# Patient Record
Sex: MALE | Race: WHITE | NOT HISPANIC OR LATINO | Employment: OTHER | URBAN - METROPOLITAN AREA
[De-identification: names, ages, dates, MRNs, and addresses within clinical notes are randomized per-mention and may not be internally consistent; named-entity substitution may affect disease eponyms.]

---

## 2023-05-10 LAB
ALANINE AMINOTRANSFERASE (SGPT) (U/L) IN SER/PLAS: 29 U/L (ref 10–52)
ALBUMIN (G/DL) IN SER/PLAS: 4.5 G/DL (ref 3.4–5)
ALBUMIN (MG/L) IN URINE: <7 MG/L
ALBUMIN/CREATININE (UG/MG) IN URINE: NORMAL UG/MG CRT (ref 0–30)
ALKALINE PHOSPHATASE (U/L) IN SER/PLAS: 107 U/L (ref 33–136)
ANION GAP IN SER/PLAS: 14 MMOL/L (ref 10–20)
ASPARTATE AMINOTRANSFERASE (SGOT) (U/L) IN SER/PLAS: 34 U/L (ref 9–39)
BILIRUBIN TOTAL (MG/DL) IN SER/PLAS: 0.6 MG/DL (ref 0–1.2)
CALCIUM (MG/DL) IN SER/PLAS: 10.1 MG/DL (ref 8.6–10.6)
CARBON DIOXIDE, TOTAL (MMOL/L) IN SER/PLAS: 26 MMOL/L (ref 21–32)
CHLORIDE (MMOL/L) IN SER/PLAS: 106 MMOL/L (ref 98–107)
CREATININE (MG/DL) IN SER/PLAS: 1.54 MG/DL (ref 0.5–1.3)
CREATININE (MG/DL) IN URINE: 45.9 MG/DL (ref 20–370)
ESTIMATED AVERAGE GLUCOSE FOR HBA1C: 177 MG/DL
GFR MALE: 44 ML/MIN/1.73M2
GLUCOSE (MG/DL) IN SER/PLAS: 194 MG/DL (ref 74–99)
HEMOGLOBIN A1C/HEMOGLOBIN TOTAL IN BLOOD: 7.8 %
POTASSIUM (MMOL/L) IN SER/PLAS: 5 MMOL/L (ref 3.5–5.3)
PROTEIN TOTAL: 7.1 G/DL (ref 6.4–8.2)
SODIUM (MMOL/L) IN SER/PLAS: 141 MMOL/L (ref 136–145)
UREA NITROGEN (MG/DL) IN SER/PLAS: 30 MG/DL (ref 6–23)

## 2023-05-12 LAB
ALANINE AMINOTRANSFERASE (SGPT) (U/L) IN SER/PLAS: 24 U/L (ref 10–52)
ALBUMIN (G/DL) IN SER/PLAS: 4.4 G/DL (ref 3.4–5)
ALKALINE PHOSPHATASE (U/L) IN SER/PLAS: 96 U/L (ref 33–136)
ANION GAP IN SER/PLAS: 11 MMOL/L (ref 10–20)
ASPARTATE AMINOTRANSFERASE (SGOT) (U/L) IN SER/PLAS: 28 U/L (ref 9–39)
BILIRUBIN TOTAL (MG/DL) IN SER/PLAS: 0.7 MG/DL (ref 0–1.2)
CALCIUM (MG/DL) IN SER/PLAS: 9.8 MG/DL (ref 8.6–10.6)
CARBON DIOXIDE, TOTAL (MMOL/L) IN SER/PLAS: 28 MMOL/L (ref 21–32)
CHLORIDE (MMOL/L) IN SER/PLAS: 108 MMOL/L (ref 98–107)
CHOLESTEROL (MG/DL) IN SER/PLAS: 135 MG/DL (ref 0–199)
CHOLESTEROL IN HDL (MG/DL) IN SER/PLAS: 62.6 MG/DL
CHOLESTEROL/HDL RATIO: 2.2
CREATININE (MG/DL) IN SER/PLAS: 1.34 MG/DL (ref 0.5–1.3)
ESTIMATED AVERAGE GLUCOSE FOR HBA1C: 177 MG/DL
GFR MALE: 51 ML/MIN/1.73M2
GLUCOSE (MG/DL) IN SER/PLAS: 140 MG/DL (ref 74–99)
HEMOGLOBIN A1C/HEMOGLOBIN TOTAL IN BLOOD: 7.8 %
LDL: 56 MG/DL (ref 0–99)
POTASSIUM (MMOL/L) IN SER/PLAS: 4.4 MMOL/L (ref 3.5–5.3)
PROTEIN TOTAL: 6.8 G/DL (ref 6.4–8.2)
SODIUM (MMOL/L) IN SER/PLAS: 143 MMOL/L (ref 136–145)
TRIGLYCERIDE (MG/DL) IN SER/PLAS: 84 MG/DL (ref 0–149)
UREA NITROGEN (MG/DL) IN SER/PLAS: 27 MG/DL (ref 6–23)
VLDL: 17 MG/DL (ref 0–40)

## 2023-05-13 LAB — LIPOPROTEIN A SER/PLAS: 12 MG/DL

## 2023-06-23 LAB
ALANINE AMINOTRANSFERASE (SGPT) (U/L) IN SER/PLAS: 25 U/L (ref 10–52)
ALBUMIN (G/DL) IN SER/PLAS: 4.2 G/DL (ref 3.4–5)
ALKALINE PHOSPHATASE (U/L) IN SER/PLAS: 102 U/L (ref 33–136)
ANION GAP IN SER/PLAS: 16 MMOL/L (ref 10–20)
ASPARTATE AMINOTRANSFERASE (SGOT) (U/L) IN SER/PLAS: 35 U/L (ref 9–39)
BILIRUBIN TOTAL (MG/DL) IN SER/PLAS: 0.8 MG/DL (ref 0–1.2)
CALCIUM (MG/DL) IN SER/PLAS: 9.5 MG/DL (ref 8.6–10.6)
CARBON DIOXIDE, TOTAL (MMOL/L) IN SER/PLAS: 22 MMOL/L (ref 21–32)
CHLORIDE (MMOL/L) IN SER/PLAS: 108 MMOL/L (ref 98–107)
CHOLESTEROL (MG/DL) IN SER/PLAS: 136 MG/DL (ref 0–199)
CHOLESTEROL IN HDL (MG/DL) IN SER/PLAS: 52.3 MG/DL
CHOLESTEROL/HDL RATIO: 2.6
CREATININE (MG/DL) IN SER/PLAS: 1.48 MG/DL (ref 0.5–1.3)
GFR MALE: 46 ML/MIN/1.73M2
GLUCOSE (MG/DL) IN SER/PLAS: 89 MG/DL (ref 74–99)
LDL: 61 MG/DL (ref 0–99)
MAGNESIUM (MG/DL) IN SER/PLAS: 2.27 MG/DL (ref 1.6–2.4)
POTASSIUM (MMOL/L) IN SER/PLAS: 4.5 MMOL/L (ref 3.5–5.3)
PROTEIN TOTAL: 6.6 G/DL (ref 6.4–8.2)
SODIUM (MMOL/L) IN SER/PLAS: 141 MMOL/L (ref 136–145)
TRIGLYCERIDE (MG/DL) IN SER/PLAS: 114 MG/DL (ref 0–149)
UREA NITROGEN (MG/DL) IN SER/PLAS: 33 MG/DL (ref 6–23)
VLDL: 23 MG/DL (ref 0–40)

## 2023-10-03 DIAGNOSIS — I50.32 CHRONIC DIASTOLIC CONGESTIVE HEART FAILURE (MULTI): Primary | ICD-10-CM

## 2023-10-04 RX ORDER — EMPAGLIFLOZIN 25 MG/1
25 TABLET, FILM COATED ORAL DAILY
COMMUNITY
Start: 2023-06-02 | End: 2023-10-04 | Stop reason: SDUPTHER

## 2023-10-04 RX ORDER — EMPAGLIFLOZIN 25 MG/1
25 TABLET, FILM COATED ORAL DAILY
Qty: 90 TABLET | Refills: 3 | Status: SHIPPED | OUTPATIENT
Start: 2023-10-04

## 2023-10-05 ENCOUNTER — PHARMACY VISIT (OUTPATIENT)
Dept: PHARMACY | Facility: CLINIC | Age: 88
End: 2023-10-05
Payer: COMMERCIAL

## 2023-10-05 PROCEDURE — RXMED WILLOW AMBULATORY MEDICATION CHARGE

## 2023-11-29 PROBLEM — I21.4 NON-STEMI (NON-ST ELEVATED MYOCARDIAL INFARCTION) (MULTI): Status: ACTIVE | Noted: 2023-11-29

## 2023-11-29 PROBLEM — N18.9 CKD (CHRONIC KIDNEY DISEASE): Status: ACTIVE | Noted: 2023-11-29

## 2023-11-29 PROBLEM — I25.10 CORONARY ARTERY DISEASE: Status: ACTIVE | Noted: 2023-11-29

## 2023-11-29 PROBLEM — I25.110 UNSTABLE ANGINA PECTORIS DUE TO CORONARY ARTERIOSCLEROSIS (MULTI): Status: ACTIVE | Noted: 2023-11-29

## 2023-11-29 PROBLEM — R00.1 BRADYCARDIA: Status: ACTIVE | Noted: 2023-11-29

## 2023-11-29 PROBLEM — E78.5 HYPERLIPIDEMIA: Status: ACTIVE | Noted: 2023-11-29

## 2023-11-29 PROBLEM — E11.9 DIABETES MELLITUS (MULTI): Status: ACTIVE | Noted: 2023-11-29

## 2023-11-29 PROBLEM — I10 HYPERTENSION, ESSENTIAL, BENIGN: Status: ACTIVE | Noted: 2023-11-29

## 2023-11-29 RX ORDER — ATORVASTATIN CALCIUM 40 MG/1
40 TABLET, FILM COATED ORAL DAILY
COMMUNITY

## 2023-11-29 RX ORDER — GEMFIBROZIL 600 MG/1
TABLET, FILM COATED ORAL 2 TIMES DAILY
COMMUNITY
End: 2023-12-06 | Stop reason: WASHOUT

## 2023-11-29 RX ORDER — GLIMEPIRIDE 1 MG/1
1 TABLET ORAL DAILY
COMMUNITY
End: 2023-12-06 | Stop reason: WASHOUT

## 2023-11-29 RX ORDER — CLOPIDOGREL BISULFATE 75 MG/1
75 TABLET ORAL DAILY
COMMUNITY
End: 2023-12-06 | Stop reason: WASHOUT

## 2023-11-29 RX ORDER — LEVOTHYROXINE SODIUM 50 UG/1
1 TABLET ORAL DAILY
COMMUNITY
Start: 2020-02-18

## 2023-11-29 RX ORDER — BLOOD-GLUCOSE METER
EACH MISCELLANEOUS 2 TIMES DAILY
COMMUNITY
Start: 2022-08-16 | End: 2023-12-06 | Stop reason: SDUPTHER

## 2023-11-29 RX ORDER — CRANBERRY FRUIT EXTRACT 650 MG
1 CAPSULE ORAL DAILY
COMMUNITY
End: 2024-05-13 | Stop reason: ALTCHOICE

## 2023-11-29 RX ORDER — DONEPEZIL HYDROCHLORIDE 10 MG/1
TABLET, FILM COATED ORAL
COMMUNITY
Start: 2023-10-02

## 2023-11-29 RX ORDER — ASPIRIN 81 MG/1
1 TABLET ORAL DAILY
COMMUNITY
Start: 2020-02-18

## 2023-12-06 ENCOUNTER — OFFICE VISIT (OUTPATIENT)
Dept: CARDIOLOGY | Facility: CLINIC | Age: 88
End: 2023-12-06
Payer: MEDICARE

## 2023-12-06 VITALS
HEIGHT: 70 IN | HEART RATE: 67 BPM | DIASTOLIC BLOOD PRESSURE: 64 MMHG | OXYGEN SATURATION: 98 % | SYSTOLIC BLOOD PRESSURE: 112 MMHG | BODY MASS INDEX: 24.58 KG/M2 | WEIGHT: 171.7 LBS

## 2023-12-06 DIAGNOSIS — E78.2 MIXED HYPERLIPIDEMIA: ICD-10-CM

## 2023-12-06 DIAGNOSIS — N18.31 STAGE 3A CHRONIC KIDNEY DISEASE (MULTI): ICD-10-CM

## 2023-12-06 DIAGNOSIS — E11.9 TYPE 2 DIABETES MELLITUS WITHOUT COMPLICATION, WITHOUT LONG-TERM CURRENT USE OF INSULIN (MULTI): ICD-10-CM

## 2023-12-06 DIAGNOSIS — I25.10 CORONARY ARTERY DISEASE INVOLVING NATIVE CORONARY ARTERY OF NATIVE HEART WITHOUT ANGINA PECTORIS: Primary | ICD-10-CM

## 2023-12-06 DIAGNOSIS — I10 HYPERTENSION, ESSENTIAL, BENIGN: ICD-10-CM

## 2023-12-06 PROCEDURE — 99214 OFFICE O/P EST MOD 30 MIN: CPT | Performed by: INTERNAL MEDICINE

## 2023-12-06 PROCEDURE — 1160F RVW MEDS BY RX/DR IN RCRD: CPT | Performed by: INTERNAL MEDICINE

## 2023-12-06 PROCEDURE — 1036F TOBACCO NON-USER: CPT | Performed by: INTERNAL MEDICINE

## 2023-12-06 PROCEDURE — 1159F MED LIST DOCD IN RCRD: CPT | Performed by: INTERNAL MEDICINE

## 2023-12-06 PROCEDURE — 1126F AMNT PAIN NOTED NONE PRSNT: CPT | Performed by: INTERNAL MEDICINE

## 2023-12-06 PROCEDURE — 3074F SYST BP LT 130 MM HG: CPT | Performed by: INTERNAL MEDICINE

## 2023-12-06 PROCEDURE — 3078F DIAST BP <80 MM HG: CPT | Performed by: INTERNAL MEDICINE

## 2023-12-06 RX ORDER — BLOOD-GLUCOSE METER
1 EACH MISCELLANEOUS 2 TIMES DAILY
Qty: 200 STRIP | Refills: 11 | Status: SHIPPED | OUTPATIENT
Start: 2023-12-06 | End: 2024-12-05

## 2023-12-06 RX ORDER — ASCORBIC ACID 125 MG
TABLET,CHEWABLE ORAL
COMMUNITY

## 2023-12-06 RX ORDER — CLOPIDOGREL BISULFATE 75 MG/1
75 TABLET ORAL DAILY
Qty: 30 TABLET | Refills: 11
Start: 2023-12-06 | End: 2024-05-13 | Stop reason: ALTCHOICE

## 2023-12-06 ASSESSMENT — ENCOUNTER SYMPTOMS
CARDIOVASCULAR NEGATIVE: 1
NEUROLOGICAL NEGATIVE: 1
GASTROINTESTINAL NEGATIVE: 1
MUSCULOSKELETAL NEGATIVE: 1
PSYCHIATRIC NEGATIVE: 1
DEPRESSION: 0
LOSS OF SENSATION IN FEET: 0
CONSTITUTIONAL NEGATIVE: 1
HEMATOLOGIC/LYMPHATIC NEGATIVE: 1
ALLERGIC/IMMUNOLOGIC NEGATIVE: 1
EYES NEGATIVE: 1
OCCASIONAL FEELINGS OF UNSTEADINESS: 0
RESPIRATORY NEGATIVE: 1
ENDOCRINE NEGATIVE: 1

## 2023-12-06 ASSESSMENT — COLUMBIA-SUICIDE SEVERITY RATING SCALE - C-SSRS
2. HAVE YOU ACTUALLY HAD ANY THOUGHTS OF KILLING YOURSELF?: NO
1. IN THE PAST MONTH, HAVE YOU WISHED YOU WERE DEAD OR WISHED YOU COULD GO TO SLEEP AND NOT WAKE UP?: NO
6. HAVE YOU EVER DONE ANYTHING, STARTED TO DO ANYTHING, OR PREPARED TO DO ANYTHING TO END YOUR LIFE?: NO

## 2023-12-06 ASSESSMENT — PATIENT HEALTH QUESTIONNAIRE - PHQ9
SUM OF ALL RESPONSES TO PHQ9 QUESTIONS 1 AND 2: 0
1. LITTLE INTEREST OR PLEASURE IN DOING THINGS: NOT AT ALL
2. FEELING DOWN, DEPRESSED OR HOPELESS: NOT AT ALL

## 2023-12-06 ASSESSMENT — PAIN SCALES - GENERAL: PAINLEVEL: 0-NO PAIN

## 2023-12-06 NOTE — PROGRESS NOTES
Preventive Cardiology Clinic Note    Reason for Visit: Follow up visit  Referring Clinician: No ref. provider found     History of Present Illness: Mr. Fernandez is an 88-year-old gentleman with coronary artery disease status post non-ST elevation myocardial infarction in 2020 with PCI to the proximal and mid left anterior descending artery complicated by longstanding type 2 diabetes mellitus, hyperlipidemia, hypertension, and stage III chronic kidney disease who was referred by his cardiologist to the clinic for risk factor optimization and here for routine follow up. Since last visit he has tolerated the SGLT2 inhibitor and finerenone well without any adverse effects. He has cut down on starchy carbs to some degree and his A1c is 7.9% and essentially stable from last visit. He is taking a high intensity statin with well-controlled lipids with an LDL cholesterol 61 and a triglyceride level of 114. His UACR is normal. eGFR is 46 and potassium is 4.5. He does not have any chest pain, shortness of breath, palpitations, or syncope.  He continues to take clopidogrel although I stopped this at his last visit as his primary cardiologist has requested he continue it.    Past Medical History:  He has a past medical history of Abnormal findings on diagnostic imaging of other specified body structures and Other conditions influencing health status.    Past Surgical History:  He has a past surgical history that includes Other surgical history (02/12/2020).    Social History:  Non-smoker    Family History:  No family history on file.    Allergies:  Patient has no known allergies.    Outpatient Medications:  Current Outpatient Medications   Medication Instructions    aspirin 81 mg EC tablet 1 tablet, oral, Daily    atorvastatin (LIPITOR) 40 mg, oral, Daily    cholecalciferol, vitamin D3, (VitaJoy Daily D) 25 mcg (1,000 unit) tablet,chewable oral    clopidogrel (PLAVIX) 75 mg, oral, Daily    donepezil (Aricept) 10 mg tablet      "finerenone (Kerendia) 10 mg tablet tablet TAKE 1 TABLET BY MOUTH ONCE DAILY    Jardiance 25 mg, oral, Daily    levothyroxine (Synthroid, Levoxyl) 50 mcg tablet 1 tablet, oral, Daily    multivit-min-FA-lycopene-boron (Bladder 2.2) 200-5-250 mcg-mg-mcg tablet 1 tablet, oral, Daily    OneTouch Verio test strips strip 1 strip, subcutaneous, 2 times daily       Review of Systems:  Review of Systems   Constitutional: Negative.   HENT: Negative.     Eyes: Negative.    Cardiovascular: Negative.    Respiratory: Negative.     Endocrine: Negative.    Hematologic/Lymphatic: Negative.    Skin: Negative.    Musculoskeletal: Negative.    Gastrointestinal: Negative.    Genitourinary: Negative.    Neurological: Negative.    Psychiatric/Behavioral: Negative.     Allergic/Immunologic: Negative.        Last Recorded Vitals:  Vitals:    12/06/23 1453   BP: 112/64   BP Location: Left arm   Patient Position: Sitting   BP Cuff Size: Adult   Pulse: 67   SpO2: 98%   Weight: 77.9 kg (171 lb 11.2 oz)   Height: 1.778 m (5' 10\")       Physical Examination:  General: Well appearing, well-nourished, in no acute distress.  HEENT: Normocephalic atraumatic, pupils equal and reactive to light, extraocular muscles intact, no conjunctival injection, oropharynx clear without exudates.  Neck: Normal carotid arterial pulses, no arterial bruits, no thyromegaly.  Cardiac: Regular rhythm and normal heart rate.  S1, S2 present and normal.  No murmurs, rubs, or gallops.  PMI is nondisplaced. Jugular venous pulsations are normal.  Pulmonary: Normal breath sounds, no increased work of breathing, no wheezes or crackles.  GI: Normal bowel sounds, abdominal aorta not enlarged, no hepatosplenomegaly, no abdominal bruits.  Lower extremities: No cyanosis, clubbing, or edema.  No xanthelasma present. Normal distal pulses.  Skin: Skin intact. No significant rashes or lesions present.  Neuro: Alert and oriented x 3, normal attention and cognition, no focal motor or " "sensory neurologic deficits.  Psych: Normal affect and mood.  Musculoskeletal: Normal gait normal muscle tone.    Laboratory Studies:  Lab Results   Component Value Date    GLUCOSE 89 06/23/2023    CALCIUM 9.5 06/23/2023     06/23/2023    K 4.5 06/23/2023    CO2 22 06/23/2023     (H) 06/23/2023    BUN 33 (H) 06/23/2023    CREATININE 1.48 (H) 06/23/2023     Lab Results   Component Value Date    ALT 25 06/23/2023    AST 35 06/23/2023    ALKPHOS 102 06/23/2023    BILITOT 0.8 06/23/2023         Lab Results   Component Value Date    CHOL 136 06/23/2023    CHOL 135 05/12/2023    CHOL 127 09/02/2022     Lab Results   Component Value Date    HDL 52.3 06/23/2023    HDL 62.6 05/12/2023    HDL 48.8 09/02/2022     No results found for: \"LDLCALC\"  Lab Results   Component Value Date    TRIG 114 06/23/2023    TRIG 84 05/12/2023    TRIG 118 09/02/2022     No components found for: \"CHOLHDL\"  Lab Results   Component Value Date    HGBA1C 7.8 (A) 05/12/2023       Cardiology Tests:  Echo:9/2023  1. Left ventricular systolic function is normal with a 55-60% estimated ejection fraction.  2. Spectral Doppler shows an impaired relaxation pattern of left ventricular diastolic filling.  3. RVSP within normal limits.      Assessment and Plan:  Problem List Items Addressed This Visit          Cardiac and Vasculature    Coronary artery disease - Primary    Relevant Medications    clopidogrel (Plavix) 75 mg tablet    Hyperlipidemia    Hypertension, essential, benign       Endocrine/Metabolic    Diabetes mellitus (CMS/HCC)    Relevant Medications    OneTouch Verio test strips strip       Genitourinary and Reproductive    CKD (chronic kidney disease)     Mr. Fernandez is an 88-year-old gentleman with coronary artery disease status post non-ST elevation myocardial infarction in 2020 with PCI to the proximal and mid left anterior descending artery complicated by longstanding type 2 diabetes mellitus, hyperlipidemia, hypertension, and stage " III chronic kidney disease who was referred by his cardiologist to the clinic for risk factor optimization and here for routine follow up.  His cardiovascular risk factors are currently well controlled including blood pressure, lipids, and hemoglobin A1c (given his age group and risk for hypoglycemia and prevalent ASCVD, hemoglobin A1c of less than 8% is reasonable).  I recommend continuing his current therapy without any changes today although I have asked him to discuss discontinuation of the clopidogrel with his primary cardiologist given likely higher bleeding risk than potential benefit at this time as his stent is over 3 years old.  I will see him again in 6 months here in the office for routine follow-up.  Please do not hesitate to call or contact me with questions or concerns.    Robby Malave MD, FREDA, FACC  Director,  [facility]  Director,  [facility]  Associate Professor of Medicine  [facility] [employer]

## 2024-01-06 PROCEDURE — RXMED WILLOW AMBULATORY MEDICATION CHARGE

## 2024-01-11 ENCOUNTER — PHARMACY VISIT (OUTPATIENT)
Dept: PHARMACY | Facility: CLINIC | Age: 89
End: 2024-01-11
Payer: COMMERCIAL

## 2024-02-16 ENCOUNTER — TELEPHONE (OUTPATIENT)
Dept: CARDIOLOGY | Facility: CLINIC | Age: 89
End: 2024-02-16

## 2024-04-03 ENCOUNTER — PHARMACY VISIT (OUTPATIENT)
Dept: PHARMACY | Facility: CLINIC | Age: 89
End: 2024-04-03
Payer: COMMERCIAL

## 2024-04-03 PROCEDURE — RXMED WILLOW AMBULATORY MEDICATION CHARGE

## 2024-04-22 PROBLEM — E55.9 VITAMIN D DEFICIENCY: Status: ACTIVE | Noted: 2021-07-21

## 2024-05-06 ENCOUNTER — LAB (OUTPATIENT)
Dept: LAB | Facility: LAB | Age: 89
End: 2024-05-06
Payer: MEDICARE

## 2024-05-06 DIAGNOSIS — I10 ESSENTIAL (PRIMARY) HYPERTENSION: ICD-10-CM

## 2024-05-06 DIAGNOSIS — I25.10 ATHEROSCLEROTIC HEART DISEASE OF NATIVE CORONARY ARTERY WITHOUT ANGINA PECTORIS: ICD-10-CM

## 2024-05-06 DIAGNOSIS — E78.5 HYPERLIPIDEMIA, UNSPECIFIED: Primary | ICD-10-CM

## 2024-05-06 LAB
ALBUMIN SERPL BCP-MCNC: 4.4 G/DL (ref 3.4–5)
ALP SERPL-CCNC: 97 U/L (ref 33–136)
ALT SERPL W P-5'-P-CCNC: 35 U/L (ref 10–52)
ANION GAP SERPL CALC-SCNC: 14 MMOL/L (ref 10–20)
AST SERPL W P-5'-P-CCNC: 36 U/L (ref 9–39)
BILIRUB SERPL-MCNC: 0.5 MG/DL (ref 0–1.2)
BUN SERPL-MCNC: 29 MG/DL (ref 6–23)
CALCIUM SERPL-MCNC: 9.6 MG/DL (ref 8.6–10.6)
CHLORIDE SERPL-SCNC: 107 MMOL/L (ref 98–107)
CHOLEST SERPL-MCNC: 157 MG/DL (ref 0–199)
CHOLESTEROL/HDL RATIO: 2.6
CO2 SERPL-SCNC: 26 MMOL/L (ref 21–32)
CREAT SERPL-MCNC: 1.44 MG/DL (ref 0.5–1.3)
EGFRCR SERPLBLD CKD-EPI 2021: 47 ML/MIN/1.73M*2
GLUCOSE SERPL-MCNC: 193 MG/DL (ref 74–99)
HDLC SERPL-MCNC: 59.8 MG/DL
LDLC SERPL CALC-MCNC: 81 MG/DL
MAGNESIUM SERPL-MCNC: 2.19 MG/DL (ref 1.6–2.4)
NON HDL CHOLESTEROL: 97 MG/DL (ref 0–149)
POTASSIUM SERPL-SCNC: 4.3 MMOL/L (ref 3.5–5.3)
PROT SERPL-MCNC: 6.6 G/DL (ref 6.4–8.2)
SODIUM SERPL-SCNC: 143 MMOL/L (ref 136–145)
TRIGL SERPL-MCNC: 82 MG/DL (ref 0–149)
VLDL: 16 MG/DL (ref 0–40)

## 2024-05-06 PROCEDURE — 80061 LIPID PANEL: CPT

## 2024-05-06 PROCEDURE — 83735 ASSAY OF MAGNESIUM: CPT

## 2024-05-06 PROCEDURE — 36415 COLL VENOUS BLD VENIPUNCTURE: CPT

## 2024-05-06 PROCEDURE — 80053 COMPREHEN METABOLIC PANEL: CPT

## 2024-05-13 ENCOUNTER — OFFICE VISIT (OUTPATIENT)
Dept: CARDIOLOGY | Facility: CLINIC | Age: 89
End: 2024-05-13
Payer: MEDICARE

## 2024-05-13 VITALS
DIASTOLIC BLOOD PRESSURE: 64 MMHG | WEIGHT: 172 LBS | HEART RATE: 53 BPM | HEIGHT: 70 IN | SYSTOLIC BLOOD PRESSURE: 94 MMHG | BODY MASS INDEX: 24.62 KG/M2

## 2024-05-13 DIAGNOSIS — E78.2 MIXED HYPERLIPIDEMIA: Primary | ICD-10-CM

## 2024-05-13 DIAGNOSIS — R00.1 BRADYCARDIA: ICD-10-CM

## 2024-05-13 PROCEDURE — 1159F MED LIST DOCD IN RCRD: CPT | Performed by: STUDENT IN AN ORGANIZED HEALTH CARE EDUCATION/TRAINING PROGRAM

## 2024-05-13 PROCEDURE — 3074F SYST BP LT 130 MM HG: CPT | Performed by: STUDENT IN AN ORGANIZED HEALTH CARE EDUCATION/TRAINING PROGRAM

## 2024-05-13 PROCEDURE — 93000 ELECTROCARDIOGRAM COMPLETE: CPT | Performed by: STUDENT IN AN ORGANIZED HEALTH CARE EDUCATION/TRAINING PROGRAM

## 2024-05-13 PROCEDURE — 3078F DIAST BP <80 MM HG: CPT | Performed by: STUDENT IN AN ORGANIZED HEALTH CARE EDUCATION/TRAINING PROGRAM

## 2024-05-13 PROCEDURE — 99215 OFFICE O/P EST HI 40 MIN: CPT | Performed by: STUDENT IN AN ORGANIZED HEALTH CARE EDUCATION/TRAINING PROGRAM

## 2024-05-13 NOTE — PROGRESS NOTES
Encounter Date: 5/15/2023     Signed             Diagnoses/Problems  Assessed    · CAD (coronary artery disease) (414.00) (I25.10)   · Hypertension, essential, benign (401.1) (I10)   · Hyperlipidemia (272.4) (E78.5)   · CKD (chronic kidney disease) (585.9) (N18.9)   · Coronary artery disease (414.00) (I25.10)     Orders  CAD (coronary artery disease), Coronary artery disease, Hypertension, essential, benign    · IO EKG Electrocardiogram- 12 Lead; Status:Active - Perform Order,Retrospective  Authorization; Requested for:[id number];   CAD (coronary artery disease), Hyperlipidemia, Hypertension, essential, benign    · Comprehensive Metabolic Panel; Status:Active - Retrospective By Protocol  Authorization; Requested for:[id number];    · Lipid Panel; Status:Active - Retrospective By Protocol Authorization; Requested  for:[id number];    · Magnesium, Serum; Status:Active - Retrospective By Protocol Authorization; Requested  for:[id number];   CAD (coronary artery disease), Hypertension, essential, benign    · Echocardiogram; Status:Active - Retrospective Authorization; Requested for:[id number];      Chief Complaint     YEARLY EXAM      History of Present IllnessPatient is an 84-year-old male who presented with non-ST elevation MI to [facility] and underwent left heart catheterization and coronary angiogram with me on January 31, 2020. Patient was found to have 99% proximal LAD lesion with thrombus and 60% mid LAD lesion as well as 30% distal apical LAD stenosis. He was also found to have moderate 40% in mid and distal left circumflex lesions in the left dominant coronary artery system. His ejection fraction was normal with no significant valvular abnormality. He underwent successful PCI of the LAD with total of 2 drug-eluting stent through radial approach. Patient past medical history is pertinent for bladder cancer, hyperlipidemia and diabetes mellitus type 2. Today patient denies having chest pain or shortness of  breath or palpitation. He reports having generalized fatigue and weakness. His EKG shows sinus bradycardia with the rate of 47 with no significant ST-T changes. His labs from February 2020 shows creatinine of 1.8 and potassium 4.3.     March 26, 2020  We performed virtual visit.   Patient denies having any chest pain or shortness of breath or palpitation. Overall is doing fairly well as he reports.  Patient had the event monitor done that showed mostly sinus rhythm with heart rate as low as 39 but most of the time in normal range and no significant arrhythmia. He denies having any dizziness or lightheadedness.     September 28, 2020  Patient denies having any chest pain or shortness of breath or palpitation. His only complaint today is some tingling and arm pain in right arm which is positional.  History also reports that he recovered from shingles recently.   His EKG today shows normal sinus rhythm with a rate of 50 with left axis deviation and no significant ST-T changes. He had labs done in September 2020 that showed potassium of 4, creatinine of 1.4 and magnesium 2.2. Patient has not in [facility] because of pandemic but is interested to go for that. His weight is 5 pounds opt since last visit.     March 24, 2021  Patient denies having any chest pain or shortness of breath or palpitation. He has had about 5 pounds of weight gain since last visit. His EKG today shows sinus rhythm with a rate of 55 and left axis deviation and no significant ST-T changes. His labs from March 2021 showed creatinine of 1.6, potassium 4.7, magnesium 2.2 and glucose 179     May 9, 2022  Patient denies having any chest pain or shortness of breath or palpitation or lightheadedness. His EKG shows sinus bradycardia with rate of 45.  His recent labs from April 2022 showed glucose of 150, potassium 4.6, creatinine 1.6, magnesium 2.2.  Patient reports that he would like to see another endocrinologist as he is not currently  happy with his diabetes management.     May 15, 2023  Patient denies any chest pain shortness of breath or palpitation. He reports sometimes he has some gas bowel sensation. His EKG today shows sinus rhythm with rate of 55 with poor R progression and premature PACs. Patient has left axis deviation.                     TTE September 2023  CONCLUSIONS:  1. Left ventricular systolic function is normal with a 55-60% estimated ejection fraction.  2. Spectral Doppler shows an impaired relaxation pattern of left ventricular diastolic filling.  3. RVSP within normal limits.    Follow up visit    05/13/24    Mingo Fernandez is a 88 y.o. male who is here for annual follow-up with me.  He is also seeing Dr. Bustamante       Patient denies any chest pain, shortness of breath, palpitation.    EKG today shows normal sinus rhythm with normal axis and no significant ST-T changes.  Heart is at 50      Past Medical History  Past Medical History:   Diagnosis Date    Abnormal findings on diagnostic imaging of other specified body structures     Abnormal chest x-ray    Other conditions influencing health status     Normal echocardiogram        Past Surgical History  Past Surgical History:   Procedure Laterality Date    OTHER SURGICAL HISTORY  02/12/2020    Cardiac catheterization with stent placement        Social history  Social History     Socioeconomic History    Marital status:      Spouse name: Not on file    Number of children: Not on file    Years of education: Not on file    Highest education level: Not on file   Occupational History    Not on file   Tobacco Use    Smoking status: Never    Smokeless tobacco: Never   Substance and Sexual Activity    Alcohol use: Not on file    Drug use: Not on file    Sexual activity: Not on file   Other Topics Concern    Not on file   Social History Narrative    Not on file     Social Determinants of Health     Financial Resource Strain: Low Risk  (9/1/2023)    Received from [employer]     Overall Financial Resource Strain (CARDIA)     Difficulty of Paying Living Expenses: Not hard at all   Food Insecurity: No Food Insecurity (9/1/2023)    Received from [employer]    Hunger Vital Sign     Worried About Running Out of Food in the Last Year: Never true     Ran Out of Food in the Last Year: Never true   Transportation Needs: No Transportation Needs (9/1/2023)    Received from [employer]    PRAPARE - Transportation     Lack of Transportation (Medical): No     Lack of Transportation (Non-Medical): No   Physical Activity: Sufficiently Active (9/1/2023)    Received from [employer]    Exercise Vital Sign     Days of Exercise per Week: 7 days     Minutes of Exercise per Session: 30 min   Stress: No Stress Concern Present (9/1/2023)    Received from [employer]    [employer] - Occupational Stress Questionnaire     Feeling of Stress : Only a little   Social Connections: Unknown (9/1/2023)    Received from [employer]    Social Connection and Isolation Panel [NHANES]     Frequency of Communication with Friends and Family: Once a week     Frequency of Social Gatherings with Friends and Family: Once a week     Attends Jewish Services: Patient declined     Active Member of Clubs or Organizations: Patient declined     Attends Club or Organization Meetings: Patient declined     Marital Status:    Intimate Partner Violence: Patient Declined (9/1/2023)    Received from [employer]    Humiliation, Afraid, Rape, and Kick questionnaire     Fear of Current or Ex-Partner: Patient declined     Emotionally Abused: Patient declined     Physically Abused: Patient declined     Sexually Abused: Patient declined   Housing Stability: Low Risk  (9/1/2023)    Received from [employer]    Housing Stability Vital Sign     Unable to Pay for Housing in the Last Year: No     Number of Places Lived in the Last Year: 1     Unstable Housing in the Last Year: No        Family History  No family  "history on file.     12 system point of review is negative except for what described in history of present illness    Allergies:  No Known Allergies     Outpatient Medications:  Current Outpatient Medications   Medication Instructions    aspirin 81 mg EC tablet 1 tablet, oral, Daily    atorvastatin (LIPITOR) 40 mg, oral, Daily    cholecalciferol, vitamin D3, (VitaJoy Daily D) 25 mcg (1,000 unit) tablet,chewable oral    clopidogrel (PLAVIX) 75 mg, oral, Daily    donepezil (Aricept) 10 mg tablet     finerenone (Kerendia) 10 mg tablet tablet TAKE 1 TABLET BY MOUTH ONCE DAILY    Jardiance 25 mg, oral, Daily    levothyroxine (Synthroid, Levoxyl) 50 mcg tablet 1 tablet, oral, Daily    OneTouch Verio test strips strip 1 strip, subcutaneous, 2 times daily         Last Recorded Vitals:      10/4/2022     9:39 AM 11/9/2022    10:30 AM 5/10/2023    10:25 AM 5/15/2023     9:51 AM 6/28/2023     9:08 AM 12/6/2023     2:53 PM 5/13/2024     2:25 PM   Vitals   Systolic  145 106 118 111 112 94   Diastolic  82 62 70 65 64 64   Heart Rate  49 62 54 46 67 53   Height (in)  1.753 m (5' 9\")  1.753 m (5' 9\") 1.753 m (5' 9\") 1.778 m (5' 10\") 1.778 m (5' 10\")   Weight (lb) 180 178.38 166.31 171.38 166.38 171.7 172   BMI 26.58 kg/m2 26.34 kg/m2 24.56 kg/m2 25.31 kg/m2 24.57 kg/m2 24.64 kg/m2 24.68 kg/m2   BSA (m2) 1.99 m2 1.98 m2 1.92 m2 1.94 m2 1.92 m2 1.96 m2 1.96 m2   Visit Report      Report Report    Visit Vitals  BP 94/64 (BP Location: Left arm, Patient Position: Sitting, BP Cuff Size: Adult)   Pulse 53   Ht 1.778 m (5' 10\")   Wt 78 kg (172 lb)   BMI 24.68 kg/m²   Smoking Status Never   BSA 1.96 m²        Physical Exam:    General: Awake, alert/oriented x3, well developed, no acute distress  Head: Atraumatic/Normocephalic  Eyes: Normal external exam, EOMI, PERRLA  ENT: Oropharynx normal, moist mucous membranes  Cardiovascular: RRR, S1/S2, no murmurs, rubs, or gallops, radial pulses +2, no edema of extremities  Pulmonary: CTAB, no " respiratory distress. No wheezes, rales, or ronchi  Abdomen: +BS, soft, non-tender, nondistended, no guarding or rebound  MSK: No joint swelling, normal movements of all extremities. Range of motion- normal.  Extremities: no edema, no cyanosis  Neuro: Alert/oriented x3, no focal motor or sensory deficits  Psychiatric: Judgment intact. Appropriate mood and behavior      I reviewed recent available cardiac studies.      Labs    Lab Results   Component Value Date    WBC 7.1 01/31/2020    HGB 15.4 01/31/2020    HCT 44.8 01/31/2020     01/31/2020    CHOL 157 05/06/2024    TRIG 82 05/06/2024    HDL 59.8 05/06/2024    ALT 35 05/06/2024    AST 36 05/06/2024     05/06/2024    K 4.3 05/06/2024     05/06/2024    CREATININE 1.44 (H) 05/06/2024    BUN 29 (H) 05/06/2024    CO2 26 05/06/2024    TSH 3.44 09/02/2022    INR 1.1 01/31/2020    HGBA1C 7.8 (A) 05/12/2023     Lab Results   Component Value Date    TROPONINI 0.27 (HH) 01/31/2020        Lab Results   Component Value Date    INR 1.1 01/31/2020    PROTIME 11.7 01/31/2020             Assessment/Plan     Patient overall is doing well from a cardiac standpoint.    Discussed option of stopping Plavix and continue with aspirin only.  He is in agreement with that.    We will continue with current medications.    Discussed the importance of heart healthy diet and exercise.    Follow-up in clinic in 1 year with prior labs including CMP, Mg, lipids and EKG at the time of visit.           Jr Tiwari MD, PhD, FACC, [facility]  Interventional Cardiology, [facility]  Associate Professor of Medicine, [facility]  Office: [phone]         **Disclaimer: This note was dictated by speech recognition, and every effort has been made to prevent any error in transcription, however minor errors may be present**

## 2024-06-12 ENCOUNTER — APPOINTMENT (OUTPATIENT)
Dept: CARDIOLOGY | Facility: CLINIC | Age: 89
End: 2024-06-12
Payer: MEDICARE

## 2024-06-19 ENCOUNTER — OFFICE VISIT (OUTPATIENT)
Dept: CARDIOLOGY | Facility: CLINIC | Age: 89
End: 2024-06-19
Payer: MEDICARE

## 2024-06-19 VITALS
HEART RATE: 63 BPM | SYSTOLIC BLOOD PRESSURE: 111 MMHG | WEIGHT: 168 LBS | OXYGEN SATURATION: 98 % | BODY MASS INDEX: 24.05 KG/M2 | HEIGHT: 70 IN | DIASTOLIC BLOOD PRESSURE: 67 MMHG

## 2024-06-19 DIAGNOSIS — I10 HYPERTENSION, ESSENTIAL, BENIGN: ICD-10-CM

## 2024-06-19 DIAGNOSIS — E78.2 MIXED HYPERLIPIDEMIA: ICD-10-CM

## 2024-06-19 DIAGNOSIS — N18.31 STAGE 3A CHRONIC KIDNEY DISEASE (MULTI): ICD-10-CM

## 2024-06-19 DIAGNOSIS — I25.10 CORONARY ARTERY DISEASE INVOLVING NATIVE CORONARY ARTERY OF NATIVE HEART WITHOUT ANGINA PECTORIS: Primary | ICD-10-CM

## 2024-06-19 DIAGNOSIS — E11.9 TYPE 2 DIABETES MELLITUS WITHOUT COMPLICATION, WITHOUT LONG-TERM CURRENT USE OF INSULIN (MULTI): ICD-10-CM

## 2024-06-19 PROCEDURE — 1126F AMNT PAIN NOTED NONE PRSNT: CPT | Performed by: INTERNAL MEDICINE

## 2024-06-19 PROCEDURE — 99214 OFFICE O/P EST MOD 30 MIN: CPT | Performed by: INTERNAL MEDICINE

## 2024-06-19 PROCEDURE — 1036F TOBACCO NON-USER: CPT | Performed by: INTERNAL MEDICINE

## 2024-06-19 PROCEDURE — 3074F SYST BP LT 130 MM HG: CPT | Performed by: INTERNAL MEDICINE

## 2024-06-19 PROCEDURE — 3078F DIAST BP <80 MM HG: CPT | Performed by: INTERNAL MEDICINE

## 2024-06-19 PROCEDURE — 1159F MED LIST DOCD IN RCRD: CPT | Performed by: INTERNAL MEDICINE

## 2024-06-19 PROCEDURE — 1160F RVW MEDS BY RX/DR IN RCRD: CPT | Performed by: INTERNAL MEDICINE

## 2024-06-19 RX ORDER — CALCIUM CARBONATE/VITAMIN D3 250-3.125
1 TABLET ORAL
COMMUNITY

## 2024-06-19 ASSESSMENT — ENCOUNTER SYMPTOMS
ALLERGIC/IMMUNOLOGIC NEGATIVE: 1
RESPIRATORY NEGATIVE: 1
OCCASIONAL FEELINGS OF UNSTEADINESS: 0
GASTROINTESTINAL NEGATIVE: 1
MUSCULOSKELETAL NEGATIVE: 1
NEUROLOGICAL NEGATIVE: 1
DEPRESSION: 0
PSYCHIATRIC NEGATIVE: 1
EYES NEGATIVE: 1
ENDOCRINE NEGATIVE: 1
CARDIOVASCULAR NEGATIVE: 1
HEMATOLOGIC/LYMPHATIC NEGATIVE: 1
LOSS OF SENSATION IN FEET: 0
CONSTITUTIONAL NEGATIVE: 1

## 2024-06-19 ASSESSMENT — PATIENT HEALTH QUESTIONNAIRE - PHQ9
1. LITTLE INTEREST OR PLEASURE IN DOING THINGS: NOT AT ALL
2. FEELING DOWN, DEPRESSED OR HOPELESS: NOT AT ALL
SUM OF ALL RESPONSES TO PHQ9 QUESTIONS 1 AND 2: 0

## 2024-06-19 ASSESSMENT — PAIN SCALES - GENERAL: PAINLEVEL: 0-NO PAIN

## 2024-06-19 NOTE — PROGRESS NOTES
Preventive Cardiology Clinic Note    Reason for Visit: Follow-up.  Referring Clinician: No ref. provider found     History of Present Illness: Mr. Fernandez is an 88-year-old gentleman with coronary artery disease status post non-ST elevation myocardial infarction in 2020 with PCI to the proximal and mid left anterior descending artery complicated by longstanding type 2 diabetes mellitus, hyperlipidemia, hypertension, and stage III chronic kidney disease who was referred by his cardiologist to the clinic for risk factor optimization and here for routine follow up.  Since last visit he has tolerated the SGLT2 inhibitor well without any adverse effects. He has cut down on starchy carbs to some degree and his A1c is 7.8% and essentially stable from last visit. He is taking a high intensity statin with well-controlled lipids with an LDL cholesterol 81 and a triglyceride level of 82.  He does not have any chest pain, shortness of breath, palpitations, or syncope.  In consultation with his primary cardiologist he stopped clopidogrel and is having less bruising.    Past Medical History:  He has a past medical history of Abnormal findings on diagnostic imaging of other specified body structures and Other conditions influencing health status.    Past Surgical History:  He has a past surgical history that includes Other surgical history (02/12/2020).    Social History:  He reports that he has never smoked. He has never used smokeless tobacco.    Family History:  No family history on file.    Allergies:  Patient has no known allergies.    Outpatient Medications:  Current Outpatient Medications   Medication Instructions    aspirin 81 mg EC tablet 1 tablet, oral, Daily    atorvastatin (LIPITOR) 40 mg, oral, Daily    calcium carbonate-vitamin D3 (Oyster Shell) 250 mg-3.125 mcg (125 unit) tablet 1 tablet, oral, 2 times daily (morning and late afternoon)    donepezil (Aricept) 10 mg tablet     Jardiance 25 mg, oral, Daily     "levothyroxine (Synthroid, Levoxyl) 50 mcg tablet 1 tablet, oral, Daily    OneTouch Verio test strips strip 1 strip, subcutaneous, 2 times daily       Review of Systems:  Review of Systems   Constitutional: Negative.   HENT: Negative.     Eyes: Negative.    Cardiovascular: Negative.    Respiratory: Negative.     Endocrine: Negative.    Hematologic/Lymphatic: Negative.    Skin: Negative.    Musculoskeletal: Negative.    Gastrointestinal: Negative.    Genitourinary: Negative.    Neurological: Negative.    Psychiatric/Behavioral: Negative.     Allergic/Immunologic: Negative.        Last Recorded Vitals:  Vitals:    06/19/24 0853   BP: 111/67   BP Location: Right arm   Patient Position: Sitting   BP Cuff Size: Adult   Pulse: 63   SpO2: 98%   Weight: 76.2 kg (168 lb)   Height: 1.778 m (5' 10\")       Physical Examination:  General: Well appearing, well-nourished, in no acute distress.  HEENT: Normocephalic atraumatic, pupils equal and reactive to light, extraocular muscles intact, no conjunctival injection, oropharynx clear without exudates.  Neck: Normal carotid arterial pulses, no arterial bruits, no thyromegaly.  Cardiac: Regular rhythm and normal heart rate.  S1, S2 present and normal.  No murmurs, rubs, or gallops.  PMI is nondisplaced. Jugular venous pulsations are normal.  Pulmonary: Normal breath sounds, no increased work of breathing, no wheezes or crackles.  GI: Normal bowel sounds, abdominal aorta not enlarged, no hepatosplenomegaly, no abdominal bruits.  Lower extremities: No cyanosis, clubbing, or edema.  No xanthelasma present. Normal distal pulses.  Skin: Skin intact. No significant rashes or lesions present.  Neuro: Alert and oriented x 3, normal attention and cognition, no focal motor or sensory neurologic deficits.  Psych: Normal affect and mood.  Musculoskeletal: Normal gait normal muscle tone.    Laboratory Studies:  Lab Results   Component Value Date    GLUCOSE 193 (H) 05/06/2024    CALCIUM 9.6 " "05/06/2024     05/06/2024    K 4.3 05/06/2024    CO2 26 05/06/2024     05/06/2024    BUN 29 (H) 05/06/2024    CREATININE 1.44 (H) 05/06/2024     Lab Results   Component Value Date    ALT 35 05/06/2024    AST 36 05/06/2024    ALKPHOS 97 05/06/2024    BILITOT 0.5 05/06/2024         Lab Results   Component Value Date    CHOL 157 05/06/2024    CHOL 136 06/23/2023    CHOL 135 05/12/2023     Lab Results   Component Value Date    HDL 59.8 05/06/2024    HDL 52.3 06/23/2023    HDL 62.6 05/12/2023     Lab Results   Component Value Date    LDLCALC 81 05/06/2024     Lab Results   Component Value Date    TRIG 82 05/06/2024    TRIG 114 06/23/2023    TRIG 84 05/12/2023     No components found for: \"CHOLHDL\"  Lab Results   Component Value Date    HGBA1C 7.8 (A) 05/12/2023     Assessment and Plan:  Problem List Items Addressed This Visit          Cardiac and Vasculature    Coronary artery disease - Primary    Hyperlipidemia    Hypertension, essential, benign       Endocrine/Metabolic    Diabetes mellitus (Multi)       Genitourinary and Reproductive    CKD (chronic kidney disease)     Mr. Fernandez is an 88-year-old gentleman with coronary artery disease status post non-ST elevation myocardial infarction in 2020 with PCI to the proximal and mid left anterior descending artery complicated by longstanding type 2 diabetes mellitus, hyperlipidemia, hypertension, and stage III chronic kidney disease who was referred by his cardiologist to the clinic for risk factor optimization and here for routine follow up.  His cardiovascular risk factors are currently well controlled including blood pressure, lipids, and hemoglobin A1c (given his age group and risk for hypoglycemia and prevalent ASCVD, hemoglobin A1c of less than 8% is reasonable).  I recommend continuing his current therapy without any changes today.  He can continue to follow-up with his primary cardiologist going forward and be happy to see him back should any new " preventive cardiology concerns arise.  Please do not hesitate to call or contact me with questions or concerns.     Robby Malave MD, FREDA, FACC  Director,  [facility]  Director,  [facility]  Associate Professor of Medicine  [employer] [facility] [employer]

## 2024-06-24 PROCEDURE — RXMED WILLOW AMBULATORY MEDICATION CHARGE

## 2024-06-25 ENCOUNTER — PHARMACY VISIT (OUTPATIENT)
Dept: PHARMACY | Facility: CLINIC | Age: 89
End: 2024-06-25
Payer: COMMERCIAL

## 2024-07-01 DIAGNOSIS — E78.2 MIXED HYPERLIPIDEMIA: ICD-10-CM

## 2024-07-09 DIAGNOSIS — E78.2 MIXED HYPERLIPIDEMIA: ICD-10-CM

## 2024-07-09 RX ORDER — ATORVASTATIN CALCIUM 40 MG/1
40 TABLET, FILM COATED ORAL DAILY
Qty: 90 TABLET | Refills: 3 | Status: SHIPPED | OUTPATIENT
Start: 2024-07-09

## 2024-07-09 RX ORDER — ATORVASTATIN CALCIUM 40 MG/1
40 TABLET, FILM COATED ORAL DAILY
Qty: 90 TABLET | Refills: 3 | Status: SHIPPED | OUTPATIENT
Start: 2024-07-09 | End: 2024-07-09 | Stop reason: SDUPTHER

## 2024-10-07 ENCOUNTER — TELEPHONE (OUTPATIENT)
Dept: CARDIOLOGY | Facility: CLINIC | Age: 89
End: 2024-10-07
Payer: MEDICARE

## 2024-10-07 DIAGNOSIS — I50.32 CHRONIC DIASTOLIC CONGESTIVE HEART FAILURE: Primary | ICD-10-CM

## 2024-10-07 PROCEDURE — RXMED WILLOW AMBULATORY MEDICATION CHARGE

## 2024-10-07 RX ORDER — EMPAGLIFLOZIN 25 MG/1
25 TABLET, FILM COATED ORAL DAILY
Qty: 90 TABLET | Refills: 3 | Status: SHIPPED | OUTPATIENT
Start: 2024-10-07

## 2024-10-07 NOTE — TELEPHONE ENCOUNTER
Per Dr. Tiwari, called and notified pt that he will refill Jardiance. Instructed pt to follow up for annual visit in May 2025 Pt verbalizes understanding.   umbilical hernia, reducible/soft/tender.../nondistended

## 2024-10-07 NOTE — TELEPHONE ENCOUNTER
Pt called to report he was seeing Dr. Malave with [facility] and he was released from the program to continue follow up with Dr. Tiwari. Per pt, Dr. Malave prescribed and has been refilling Jardiance 25mg daily.    Pt asking if Dr. Tiwari will refill his Jardiance now that he will no longer be seeing Dr. Malave.    Please advise if ok to refill Jardiance. Thanks.

## 2024-10-09 ENCOUNTER — PHARMACY VISIT (OUTPATIENT)
Dept: PHARMACY | Facility: CLINIC | Age: 89
End: 2024-10-09
Payer: COMMERCIAL

## 2025-01-06 ENCOUNTER — PHARMACY VISIT (OUTPATIENT)
Dept: PHARMACY | Facility: CLINIC | Age: 89
End: 2025-01-06
Payer: MEDICARE

## 2025-01-07 PROCEDURE — RXMED WILLOW AMBULATORY MEDICATION CHARGE

## 2025-01-08 ENCOUNTER — PHARMACY VISIT (OUTPATIENT)
Dept: PHARMACY | Facility: CLINIC | Age: 89
End: 2025-01-08
Payer: COMMERCIAL

## 2025-03-27 DIAGNOSIS — I50.32 CHRONIC DIASTOLIC CONGESTIVE HEART FAILURE: ICD-10-CM

## 2025-03-27 RX ORDER — EMPAGLIFLOZIN 25 MG/1
25 TABLET, FILM COATED ORAL DAILY
Qty: 90 TABLET | Refills: 3 | Status: SHIPPED | OUTPATIENT
Start: 2025-03-27

## 2025-05-19 ENCOUNTER — TELEPHONE (OUTPATIENT)
Dept: CARDIOLOGY | Facility: CLINIC | Age: 89
End: 2025-05-19
Payer: MEDICARE

## 2025-05-19 DIAGNOSIS — E78.5 HYPERLIPIDEMIA, UNSPECIFIED HYPERLIPIDEMIA TYPE: ICD-10-CM

## 2025-05-19 DIAGNOSIS — I10 HYPERTENSION, ESSENTIAL, BENIGN: ICD-10-CM

## 2025-05-19 DIAGNOSIS — I25.10 CVD (CARDIOVASCULAR DISEASE): ICD-10-CM

## 2025-05-19 NOTE — TELEPHONE ENCOUNTER
Torri called because pt has ov with Dr. Tiwari and believes he needs new lab orders.    Dr. Tiwari ordered CMP, Mg+, FLP. Will place orders. Torri notified.

## 2025-05-21 ENCOUNTER — APPOINTMENT (OUTPATIENT)
Dept: CARDIOLOGY | Facility: CLINIC | Age: 89
End: 2025-05-21
Payer: MEDICARE

## 2025-06-09 DIAGNOSIS — E78.2 MIXED HYPERLIPIDEMIA: ICD-10-CM

## 2025-06-10 RX ORDER — ATORVASTATIN CALCIUM 40 MG/1
40 TABLET, FILM COATED ORAL DAILY
Qty: 90 TABLET | Refills: 0 | Status: SHIPPED | OUTPATIENT
Start: 2025-06-10

## 2025-06-25 LAB
ALBUMIN SERPL-MCNC: 4.3 G/DL (ref 3.6–5.1)
ALP SERPL-CCNC: 85 U/L (ref 35–144)
ALT SERPL-CCNC: 20 U/L (ref 9–46)
ANION GAP SERPL CALCULATED.4IONS-SCNC: 9 MMOL/L (CALC) (ref 7–17)
AST SERPL-CCNC: 27 U/L (ref 10–35)
BILIRUB SERPL-MCNC: 0.7 MG/DL (ref 0.2–1.2)
BUN SERPL-MCNC: 27 MG/DL (ref 7–25)
CALCIUM SERPL-MCNC: 9.2 MG/DL (ref 8.6–10.3)
CHLORIDE SERPL-SCNC: 109 MMOL/L (ref 98–110)
CHOLEST SERPL-MCNC: 151 MG/DL
CHOLEST/HDLC SERPL: 2.3 (CALC)
CO2 SERPL-SCNC: 24 MMOL/L (ref 20–32)
CREAT SERPL-MCNC: 1.31 MG/DL (ref 0.7–1.22)
EGFRCR SERPLBLD CKD-EPI 2021: 52 ML/MIN/1.73M2
GLUCOSE SERPL-MCNC: 139 MG/DL (ref 65–99)
HDLC SERPL-MCNC: 66 MG/DL
LDLC SERPL CALC-MCNC: 68 MG/DL (CALC)
MAGNESIUM SERPL-MCNC: 2.3 MG/DL (ref 1.5–2.5)
NONHDLC SERPL-MCNC: 85 MG/DL (CALC)
POTASSIUM SERPL-SCNC: 4.3 MMOL/L (ref 3.5–5.3)
PROT SERPL-MCNC: 6.7 G/DL (ref 6.1–8.1)
SODIUM SERPL-SCNC: 142 MMOL/L (ref 135–146)
TRIGL SERPL-MCNC: 91 MG/DL

## 2025-06-30 ENCOUNTER — APPOINTMENT (OUTPATIENT)
Dept: CARDIOLOGY | Facility: CLINIC | Age: 89
End: 2025-06-30
Payer: MEDICARE

## 2025-06-30 VITALS
HEART RATE: 53 BPM | HEIGHT: 70 IN | WEIGHT: 174.8 LBS | DIASTOLIC BLOOD PRESSURE: 62 MMHG | OXYGEN SATURATION: 98 % | SYSTOLIC BLOOD PRESSURE: 110 MMHG | BODY MASS INDEX: 25.03 KG/M2

## 2025-06-30 DIAGNOSIS — I25.10 CORONARY ARTERY DISEASE, UNSPECIFIED VESSEL OR LESION TYPE, UNSPECIFIED WHETHER ANGINA PRESENT, UNSPECIFIED WHETHER NATIVE OR TRANSPLANTED HEART: ICD-10-CM

## 2025-06-30 PROCEDURE — 1159F MED LIST DOCD IN RCRD: CPT | Performed by: STUDENT IN AN ORGANIZED HEALTH CARE EDUCATION/TRAINING PROGRAM

## 2025-06-30 PROCEDURE — 3078F DIAST BP <80 MM HG: CPT | Performed by: STUDENT IN AN ORGANIZED HEALTH CARE EDUCATION/TRAINING PROGRAM

## 2025-06-30 PROCEDURE — 99202 OFFICE O/P NEW SF 15 MIN: CPT | Mod: 25

## 2025-06-30 PROCEDURE — 99212 OFFICE O/P EST SF 10 MIN: CPT | Mod: 25

## 2025-06-30 PROCEDURE — 3074F SYST BP LT 130 MM HG: CPT | Performed by: STUDENT IN AN ORGANIZED HEALTH CARE EDUCATION/TRAINING PROGRAM

## 2025-06-30 PROCEDURE — 93005 ELECTROCARDIOGRAM TRACING: CPT | Performed by: STUDENT IN AN ORGANIZED HEALTH CARE EDUCATION/TRAINING PROGRAM

## 2025-06-30 PROCEDURE — 99213 OFFICE O/P EST LOW 20 MIN: CPT | Performed by: STUDENT IN AN ORGANIZED HEALTH CARE EDUCATION/TRAINING PROGRAM

## 2025-06-30 PROCEDURE — 93010 ELECTROCARDIOGRAM REPORT: CPT | Performed by: STUDENT IN AN ORGANIZED HEALTH CARE EDUCATION/TRAINING PROGRAM

## 2025-06-30 RX ORDER — PIOGLITAZONE 15 MG/1
15 TABLET ORAL
COMMUNITY
Start: 2024-09-04 | End: 2025-09-04

## 2025-06-30 NOTE — PROGRESS NOTES
History of Present IllnessPatient is an 84-year-old male who presented with non-ST elevation MI to [facility] and underwent left heart catheterization and coronary angiogram with me on January 31, 2020. Patient was found to have 99% proximal LAD lesion with thrombus and 60% mid LAD lesion as well as 30% distal apical LAD stenosis. He was also found to have moderate 40% in mid and distal left circumflex lesions in the left dominant coronary artery system. His ejection fraction was normal with no significant valvular abnormality. He underwent successful PCI of the LAD with total of 2 drug-eluting stent through radial approach. Patient past medical history is pertinent for bladder cancer, hyperlipidemia and diabetes mellitus type 2. Today patient denies having chest pain or shortness of breath or palpitation. He reports having generalized fatigue and weakness. His EKG shows sinus bradycardia with the rate of 47 with no significant ST-T changes. His labs from February 2020 shows creatinine of 1.8 and potassium 4.3.     March 26, 2020  We performed virtual visit.   Patient denies having any chest pain or shortness of breath or palpitation. Overall is doing fairly well as he reports.  Patient had the event monitor done that showed mostly sinus rhythm with heart rate as low as 39 but most of the time in normal range and no significant arrhythmia. He denies having any dizziness or lightheadedness.     September 28, 2020  Patient denies having any chest pain or shortness of breath or palpitation. His only complaint today is some tingling and arm pain in right arm which is positional.  History also reports that he recovered from shingles recently.   His EKG today shows normal sinus rhythm with a rate of 50 with left axis deviation and no significant ST-T changes. He had labs done in September 2020 that showed potassium of 4, creatinine of 1.4 and magnesium 2.2. Patient has not in [facility]  because of pandemic but is interested to go for that. His weight is 5 pounds opt since last visit.     March 24, 2021  Patient denies having any chest pain or shortness of breath or palpitation. He has had about 5 pounds of weight gain since last visit. His EKG today shows sinus rhythm with a rate of 55 and left axis deviation and no significant ST-T changes. His labs from March 2021 showed creatinine of 1.6, potassium 4.7, magnesium 2.2 and glucose 179     May 9, 2022  Patient denies having any chest pain or shortness of breath or palpitation or lightheadedness. His EKG shows sinus bradycardia with rate of 45.  His recent labs from April 2022 showed glucose of 150, potassium 4.6, creatinine 1.6, magnesium 2.2.  Patient reports that he would like to see another endocrinologist as he is not currently happy with his diabetes management.     May 15, 2023  Patient denies any chest pain shortness of breath or palpitation. He reports sometimes he has some gas bowel sensation. His EKG today shows sinus rhythm with rate of 55 with poor R progression and premature PACs. Patient has left axis deviation.                     TTE September 2023  CONCLUSIONS:  1. Left ventricular systolic function is normal with a 55-60% estimated ejection fraction.  2. Spectral Doppler shows an impaired relaxation pattern of left ventricular diastolic filling.  3. RVSP within normal limits.     Follow up visit     05/13/24     Mingo Fernandez is a 88 y.o. male who is here for annual follow-up with me.  He is also seeing Dr. Bustamante         Patient denies any chest pain, shortness of breath, palpitation.     EKG today shows normal sinus rhythm with normal axis and no significant ST-T changes.  Heart is at 50       Follow up visit    06/30/25    Mingo Fernandez is a 89 y.o. male who is here for annual follow-up.  Recent LDL 68  Patient denies any chest pain, shortness of breath, palpitation.    EKG today shows normal sinus rhythm with normal axis and no  significant ST-T changes.  Heart rate at 53.      Past Medical History  Medical History[1]     Past Surgical History  Surgical History[2]     Social history  Social History     Socioeconomic History    Marital status:      Spouse name: Not on file    Number of children: Not on file    Years of education: Not on file    Highest education level: Not on file   Occupational History    Not on file   Tobacco Use    Smoking status: Never    Smokeless tobacco: Never   Substance and Sexual Activity    Alcohol use: Not on file    Drug use: Not on file    Sexual activity: Not on file   Other Topics Concern    Not on file   Social History Narrative    Not on file     Social Drivers of Health     Financial Resource Strain: Low Risk  (9/1/2023)    Received from [employer]    Overall Financial Resource Strain (CARDIA)     Difficulty of Paying Living Expenses: Not hard at all   Food Insecurity: No Food Insecurity (9/1/2023)    Received from [employer]    Hunger Vital Sign     Worried About Running Out of Food in the Last Year: Never true     Ran Out of Food in the Last Year: Never true   Transportation Needs: No Transportation Needs (9/1/2023)    Received from [employer]    PRAPARE - Transportation     Lack of Transportation (Medical): No     Lack of Transportation (Non-Medical): No   Physical Activity: Sufficiently Active (9/1/2023)    Received from [employer]    Exercise Vital Sign     Days of Exercise per Week: 7 days     Minutes of Exercise per Session: 30 min   Stress: No Stress Concern Present (9/1/2023)    Received from [employer]    [employer] [place] [employer] - Occupational Stress Questionnaire     Feeling of Stress : Only a little   Social Connections: Unknown (9/1/2023)    Received from [employer]    Social Connection and Isolation Panel [NHANES]     Frequency of Communication with Friends and Family: Once a week     Frequency of Social Gatherings with Friends and Family: Once a week      "Attends Anabaptist Services: Patient declined     Active Member of Clubs or Organizations: Patient declined     Attends Club or Organization Meetings: Patient declined     Marital Status:    Intimate Partner Violence: Patient Declined (9/1/2023)    Received from [employer]    Humiliation, Afraid, Rape, and Kick questionnaire     Fear of Current or Ex-Partner: Patient declined     Emotionally Abused: Patient declined     Physically Abused: Patient declined     Sexually Abused: Patient declined   Housing Stability: Low Risk  (9/1/2023)    Received from [employer]    Housing Stability Vital Sign     Unable to Pay for Housing in the Last Year: No     Number of Places Lived in the Last Year: 1     Unstable Housing in the Last Year: No        Family History  Family History[3]     12 system point of review is negative except for what described in history of present illness    Allergies:  RX Allergies[4]     Outpatient Medications:  Current Outpatient Medications   Medication Instructions    aspirin 81 mg EC tablet 1 tablet, Daily    atorvastatin (LIPITOR) 40 mg, oral, Daily    calcium carbonate-vitamin D3 (Oyster Shell) 250 mg-3.125 mcg (125 unit) tablet 1 tablet, 2 times daily (morning and late afternoon)    donepezil (Aricept) 10 mg tablet     Jardiance 25 mg, oral, Daily    levothyroxine (Synthroid, Levoxyl) 50 mcg tablet 1 tablet, Daily    pioglitazone (ACTOS) 15 mg, Daily RT         Last Recorded Vitals:      5/10/2023    10:25 AM 5/15/2023     9:51 AM 6/28/2023     9:08 AM 12/6/2023     2:53 PM 5/13/2024     2:25 PM 6/19/2024     8:53 AM 6/30/2025     8:32 AM   Vitals   Systolic 106 118 111 112 94 111 110   Diastolic 62 70 65 64 64 67 62   BP Location    Left arm Left arm Right arm Left arm   Heart Rate 62 54 46 67 53 63 53   Height  1.753 m (5' 9\") 1.753 m (5' 9\") 1.778 m (5' 10\") 1.778 m (5' 10\") 1.778 m (5' 10\") 1.778 m (5' 10\")   Weight (lb) 166.31 171.38 166.38 171.7 172 168 174.8   BMI 24.56 kg/m2 " "25.31 kg/m2 24.57 kg/m2 24.64 kg/m2 24.68 kg/m2 24.11 kg/m2 25.08 kg/m2   BSA (m2) 1.92 m2 1.94 m2 1.92 m2 1.96 m2 1.96 m2 1.94 m2 1.98 m2   Visit Report    Report Report Report Report    Visit Vitals  /62 (BP Location: Left arm, Patient Position: Sitting, BP Cuff Size: Adult)   Pulse 53   Ht 1.778 m (5' 10\")   Wt 79.3 kg (174 lb 12.8 oz)   SpO2 98%   BMI 25.08 kg/m²   Smoking Status Never   BSA 1.98 m²        Physical Exam:    General: Awake, alert/oriented x3, well developed, no acute distress  Head: Atraumatic/Normocephalic  Eyes: Normal external exam, EOMI, PERRLA  ENT: Oropharynx normal, moist mucous membranes  Cardiovascular: RRR, S1/S2, no murmurs, rubs, or gallops, radial pulses +2, no edema of extremities  Pulmonary: CTAB, no respiratory distress. No wheezes, rales, or ronchi  Abdomen: +BS, soft, non-tender, nondistended, no guarding or rebound  MSK: No joint swelling, normal movements of all extremities. Range of motion- normal.  Extremities: no edema, no cyanosis  Neuro: Alert/oriented x3, no focal motor or sensory deficits  Psychiatric: Judgment intact. Appropriate mood and behavior      I reviewed recent available cardiac studies.      Labs    Lab Results   Component Value Date    WBC 7.1 01/31/2020    HGB 15.4 01/31/2020    HCT 44.8 01/31/2020     01/31/2020    CHOL 151 06/24/2025    TRIG 91 06/24/2025    HDL 66 06/24/2025    ALT 20 06/24/2025    AST 27 06/24/2025     06/24/2025    K 4.3 06/24/2025     06/24/2025    CREATININE 1.31 (H) 06/24/2025    BUN 27 (H) 06/24/2025    CO2 24 06/24/2025    TSH 3.44 09/02/2022    INR 1.1 01/31/2020    HGBA1C 7.8 (A) 05/12/2023     Lab Results   Component Value Date    TROPONINI 0.27 (HH) 01/31/2020        Lab Results   Component Value Date    INR 1.1 01/31/2020    PROTIME 11.7 01/31/2020             Assessment/Plan     Patient overall is doing well from a cardiac standpoint.    We will continue with current medications.    Discussed the " importance of heart healthy diet and exercise.    Follow-up in clinic in 1 year with prior labs including CMP, Mg, lipids and EKG at the time of visit.           Jr Tiwari MD, PhD, [facility], [facility]  Interventional Cardiology, Senior Attending Physician,  [facility] Catheterization Laboratory Medical Director,  [facility]  Associate Professor of Medicine,   [facility]  Office: [phone]         **Disclaimer: This note was dictated by speech recognition, and every effort has been made to prevent any error in transcription, however minor errors may be present**         [1]   Past Medical History:  Diagnosis Date    Abnormal findings on diagnostic imaging of other specified body structures     Abnormal chest x-ray    Other conditions influencing health status     Normal echocardiogram   [2]   Past Surgical History:  Procedure Laterality Date    OTHER SURGICAL HISTORY  02/12/2020    Cardiac catheterization with stent placement   [3] No family history on file.  [4] No Known Allergies

## 2025-07-01 LAB
ATRIAL RATE: 53 BPM
P AXIS: 37 DEGREES
P OFFSET: 196 MS
P ONSET: 140 MS
PR INTERVAL: 160 MS
Q ONSET: 220 MS
QRS COUNT: 9 BEATS
QRS DURATION: 94 MS
QT INTERVAL: 414 MS
QTC CALCULATION(BAZETT): 388 MS
QTC FREDERICIA: 397 MS
R AXIS: -36 DEGREES
T AXIS: -14 DEGREES
T OFFSET: 427 MS
VENTRICULAR RATE: 53 BPM

## 2025-09-05 DIAGNOSIS — E78.2 MIXED HYPERLIPIDEMIA: ICD-10-CM

## 2025-09-05 RX ORDER — ATORVASTATIN CALCIUM 40 MG/1
40 TABLET, FILM COATED ORAL DAILY
Qty: 90 TABLET | Refills: 3 | Status: SHIPPED | OUTPATIENT
Start: 2025-09-05

## 2026-07-13 ENCOUNTER — APPOINTMENT (OUTPATIENT)
Dept: CARDIOLOGY | Facility: CLINIC | Age: OVER 89
End: 2026-07-13
Payer: MEDICARE